# Patient Record
Sex: FEMALE | Race: WHITE | NOT HISPANIC OR LATINO
[De-identification: names, ages, dates, MRNs, and addresses within clinical notes are randomized per-mention and may not be internally consistent; named-entity substitution may affect disease eponyms.]

---

## 2022-07-15 PROBLEM — Z00.00 ENCOUNTER FOR PREVENTIVE HEALTH EXAMINATION: Status: ACTIVE | Noted: 2022-07-15

## 2022-08-15 ENCOUNTER — APPOINTMENT (OUTPATIENT)
Dept: ANTEPARTUM | Facility: CLINIC | Age: 32
End: 2022-08-15

## 2022-08-15 ENCOUNTER — ASOB RESULT (OUTPATIENT)
Age: 32
End: 2022-08-15

## 2022-08-15 PROCEDURE — 76811 OB US DETAILED SNGL FETUS: CPT

## 2022-12-22 ENCOUNTER — INPATIENT (INPATIENT)
Facility: HOSPITAL | Age: 32
LOS: 2 days | Discharge: HOME | End: 2022-12-25
Attending: OBSTETRICS & GYNECOLOGY | Admitting: OBSTETRICS & GYNECOLOGY
Payer: COMMERCIAL

## 2022-12-22 VITALS — HEART RATE: 80 BPM | OXYGEN SATURATION: 97 %

## 2022-12-22 DIAGNOSIS — O26.899 OTHER SPECIFIED PREGNANCY RELATED CONDITIONS, UNSPECIFIED TRIMESTER: ICD-10-CM

## 2022-12-22 DIAGNOSIS — Z34.80 ENCOUNTER FOR SUPERVISION OF OTHER NORMAL PREGNANCY, UNSPECIFIED TRIMESTER: ICD-10-CM

## 2022-12-22 LAB
BASOPHILS # BLD AUTO: 0.03 K/UL — SIGNIFICANT CHANGE UP (ref 0–0.2)
BASOPHILS NFR BLD AUTO: 0.2 % — SIGNIFICANT CHANGE UP (ref 0–2)
BLD GP AB SCN SERPL QL: NEGATIVE — SIGNIFICANT CHANGE UP
COVID-19 SPIKE DOMAIN AB INTERP: POSITIVE
COVID-19 SPIKE DOMAIN ANTIBODY RESULT: >250 U/ML — HIGH
EOSINOPHIL # BLD AUTO: 0.17 K/UL — SIGNIFICANT CHANGE UP (ref 0–0.5)
EOSINOPHIL NFR BLD AUTO: 1.1 % — SIGNIFICANT CHANGE UP (ref 0–6)
HCT VFR BLD CALC: 36 % — SIGNIFICANT CHANGE UP (ref 34.5–45)
HGB BLD-MCNC: 11 G/DL — LOW (ref 11.5–15.5)
IMM GRANULOCYTES NFR BLD AUTO: 1.5 % — HIGH (ref 0–0.9)
LYMPHOCYTES # BLD AUTO: 1.52 K/UL — SIGNIFICANT CHANGE UP (ref 1–3.3)
LYMPHOCYTES # BLD AUTO: 9.8 % — LOW (ref 13–44)
MCHC RBC-ENTMCNC: 23.2 PG — LOW (ref 27–34)
MCHC RBC-ENTMCNC: 30.6 GM/DL — LOW (ref 32–36)
MCV RBC AUTO: 75.8 FL — LOW (ref 80–100)
MONOCYTES # BLD AUTO: 1.42 K/UL — HIGH (ref 0–0.9)
MONOCYTES NFR BLD AUTO: 9.1 % — SIGNIFICANT CHANGE UP (ref 2–14)
NEUTROPHILS # BLD AUTO: 12.14 K/UL — HIGH (ref 1.8–7.4)
NEUTROPHILS NFR BLD AUTO: 78.3 % — HIGH (ref 43–77)
NRBC # BLD: 0 /100 WBCS — SIGNIFICANT CHANGE UP (ref 0–0)
PLATELET # BLD AUTO: 183 K/UL — SIGNIFICANT CHANGE UP (ref 150–400)
RBC # BLD: 4.75 M/UL — SIGNIFICANT CHANGE UP (ref 3.8–5.2)
RBC # FLD: 17.4 % — HIGH (ref 10.3–14.5)
RH IG SCN BLD-IMP: POSITIVE — SIGNIFICANT CHANGE UP
RH IG SCN BLD-IMP: POSITIVE — SIGNIFICANT CHANGE UP
SARS-COV-2 IGG+IGM SERPL QL IA: >250 U/ML — HIGH
SARS-COV-2 IGG+IGM SERPL QL IA: POSITIVE
SARS-COV-2 RNA SPEC QL NAA+PROBE: SIGNIFICANT CHANGE UP
T PALLIDUM AB TITR SER: NEGATIVE — SIGNIFICANT CHANGE UP
WBC # BLD: 15.52 K/UL — HIGH (ref 3.8–10.5)
WBC # FLD AUTO: 15.52 K/UL — HIGH (ref 3.8–10.5)

## 2022-12-22 RX ORDER — SODIUM CHLORIDE 9 MG/ML
300 INJECTION INTRAMUSCULAR; INTRAVENOUS; SUBCUTANEOUS ONCE
Refills: 0 | Status: DISCONTINUED | OUTPATIENT
Start: 2022-12-22 | End: 2022-12-25

## 2022-12-22 RX ORDER — INFLUENZA VIRUS VACCINE 15; 15; 15; 15 UG/.5ML; UG/.5ML; UG/.5ML; UG/.5ML
0.5 SUSPENSION INTRAMUSCULAR ONCE
Refills: 0 | Status: DISCONTINUED | OUTPATIENT
Start: 2022-12-22 | End: 2022-12-25

## 2022-12-22 RX ORDER — SODIUM CHLORIDE 9 MG/ML
1000 INJECTION, SOLUTION INTRAVENOUS
Refills: 0 | Status: DISCONTINUED | OUTPATIENT
Start: 2022-12-22 | End: 2022-12-23

## 2022-12-22 RX ORDER — OXYTOCIN 10 UNIT/ML
4 VIAL (ML) INJECTION
Qty: 30 | Refills: 0 | Status: DISCONTINUED | OUTPATIENT
Start: 2022-12-22 | End: 2022-12-23

## 2022-12-22 RX ORDER — VANCOMYCIN HCL 1 G
1500 VIAL (EA) INTRAVENOUS EVERY 8 HOURS
Refills: 0 | Status: DISCONTINUED | OUTPATIENT
Start: 2022-12-22 | End: 2022-12-23

## 2022-12-22 RX ORDER — SODIUM CHLORIDE 9 MG/ML
1000 INJECTION INTRAMUSCULAR; INTRAVENOUS; SUBCUTANEOUS
Refills: 0 | Status: DISCONTINUED | OUTPATIENT
Start: 2022-12-22 | End: 2022-12-25

## 2022-12-22 RX ORDER — OXYTOCIN 10 UNIT/ML
333.33 VIAL (ML) INJECTION
Qty: 20 | Refills: 0 | Status: COMPLETED | OUTPATIENT
Start: 2022-12-22 | End: 2022-12-22

## 2022-12-22 RX ORDER — CITRIC ACID/SODIUM CITRATE 300-500 MG
15 SOLUTION, ORAL ORAL EVERY 6 HOURS
Refills: 0 | Status: DISCONTINUED | OUTPATIENT
Start: 2022-12-22 | End: 2022-12-23

## 2022-12-22 RX ORDER — CHLORHEXIDINE GLUCONATE 213 G/1000ML
1 SOLUTION TOPICAL ONCE
Refills: 0 | Status: DISCONTINUED | OUTPATIENT
Start: 2022-12-22 | End: 2022-12-23

## 2022-12-22 RX ADMIN — SODIUM CHLORIDE 125 MILLILITER(S): 9 INJECTION, SOLUTION INTRAVENOUS at 12:42

## 2022-12-22 RX ADMIN — Medication 0.25 MILLIGRAM(S): at 22:42

## 2022-12-22 RX ADMIN — Medication 4 MILLIUNIT(S)/MIN: at 14:36

## 2022-12-22 RX ADMIN — SODIUM CHLORIDE 125 MILLILITER(S): 9 INJECTION, SOLUTION INTRAVENOUS at 14:36

## 2022-12-22 RX ADMIN — Medication 300 MILLIGRAM(S): at 16:40

## 2022-12-22 NOTE — PRE-ANESTHESIA EVALUATION ADULT - NSANTHPMHFT_GEN_ALL_CORE
Precipitous labor  Genital herpes  Anxiety  Scoliosis    Denies Precipitous labor  Genital herpes  Anxiety  Scoliosis  Sciatica throughout pregnancy

## 2022-12-22 NOTE — PRE-ANESTHESIA EVALUATION ADULT - NSANTHBPHIGHRD_ENT_A_CORE
Hi there. This pt would like to see you. Are you offering virtual visits at this time? Can you reach out to her? Thanks. Hope you're doing well. No

## 2022-12-22 NOTE — OB RN PATIENT PROFILE - FUNCTIONAL ASSESSMENT - BASIC MOBILITY 6.
4-calculated by average/Not able to assess (calculate score using [facility] averaging method)

## 2022-12-22 NOTE — OB PROVIDER LABOR PROGRESS NOTE - NS_STATION_OBGYN_ALL_OB_NU
-3
Pt aox4. Pt c/o marianna ear pressure, sinus congestion, fever and chills started yesterday. Pt denies cough.
-3
-3

## 2022-12-22 NOTE — OB PROVIDER H&P - NSHPPHYSICALEXAM_GEN_ALL_CORE
Physical Exam     Gen: NAD, A&O x 3  Pulm: nonlabor breathing   Heart: RRR  Abd: soft, gravid  VE no visible lesions on vulvar, cervix 1/50/-3, grossly ruptured, + nitrazine     BSUS: Vertex

## 2022-12-22 NOTE — OB RN PATIENT PROFILE - FALL HARM RISK - UNIVERSAL INTERVENTIONS
Bed in lowest position, wheels locked, appropriate side rails in place/Call bell, personal items and telephone in reach/Instruct patient to call for assistance before getting out of bed or chair/Non-slip footwear when patient is out of bed/[place] to call system/Physically safe environment - no spills, clutter or unnecessary equipment/Purposeful Proactive Rounding/Room/bathroom lighting operational, light cord in reach

## 2022-12-22 NOTE — OB PROVIDER H&P - HISTORY OF PRESENT ILLNESS
33 y/o  @ 38w6d ARNOLDO 22, presents with c/o gush of fluid @ 10:30a, then started to have contractions every 3-4 minutes at 11a. reports +FM, denies vaginal bleeding     - POBHx:  (10/29/15, 8lb 6oz, c/b PPH),  (18, 8lb 12oz, precipitous delivery)  - PGYNHx: denies hx of fibroid or ovarian cysts. Hx of HSV (only outbreak ~ 1 year ago, on Valtrex 500 mg BID for suppression)  - PMHx: denies   - PSHx: denies   - ALL: PCN- unknown reactions  - SH: Hx of anxiety on Fluoxetine 20 mg QD, denies hx of depression

## 2022-12-22 NOTE — OB PROVIDER LABOR PROGRESS NOTE - ASSESSMENT
A/P 32y  @38w6d IOL  -IOL: c/w pit  -Cat 2 tracing  -GBS pos, on vanc  -EFW 4600  -COVID neg  -Analgesia epi  -Anticipate     d/w Dr. Mark Ortiz, PGY-1  
- For anesthesia top off  - Continue to monitor    Kishore Ortiz, PGY-1  
A/P 32y  @38w6d IOL  -IOL: pitocin paused for recovery of FHT, IUPC placed, ordered for amnioinfusion  -Cat 2 tracing  -GBS pos, on vanc  -EFW 4600  -COVID neg  -Analgesia epi  -Anticipate     d/w Dr. Mark Ortiz, PGY-1
Pt. to receive epidural.  Anticipate vaginal delivery.    SUPRIYA Govea

## 2022-12-22 NOTE — OB PROVIDER H&P - ASSESSMENT
31 y/o  @ 38w6d ARNOLDO 22, PROM @ 10:30a    - Admit to L&D  - CLD/IVF  - EFW/[place]  - admission labs, COVID PCR  - +GBS: may be resistant to clindamycin on report, will start vancomycin   - expectant management for now    d/w Dr. Charles HOLCOMBC

## 2022-12-22 NOTE — OB PROVIDER LABOR PROGRESS NOTE - NS_SUBJECTIVE/OBJECTIVE_OBGYN_ALL_OB_FT
Terb x1 given in the setting of tetanic contractions with recurrent decels.
R2 Labor Note    S: Patient evaluated at bedside for placement of IUPC.    O:  T(C): 37.1 (12-22-22 @ 19:28), Max: 37.1 (12-22-22 @ 19:28)  HR: 95 (12-22-22 @ 22:10) (74 - 104)  BP: 138/96 (12-22-22 @ 22:06) (94/50 - 142/85)  RR: 17 (12-22-22 @ 17:06) (17 - 17)  SpO2: 99% (12-22-22 @ 22:15) (88% - 100%)
Pt. on pitocin c/o painful ctx requesting epidural.
R1 Labor Note    S: Patient evaluated at bedside for cervical change. Notes more pelvic pressure.    O:  T(C): 37.1 (12-22-22 @ 19:28), Max: 37.1 (12-22-22 @ 19:28)  HR: 95 (12-22-22 @ 22:10) (74 - 104)  BP: 138/96 (12-22-22 @ 22:06) (94/50 - 142/85)  RR: 17 (12-22-22 @ 17:06) (17 - 17)  SpO2: 98% (12-22-22 @ 22:10) (88% - 100%)

## 2022-12-22 NOTE — PROGRESS NOTE ADULT - SUBJECTIVE AND OBJECTIVE BOX
Anesthesiology Attending Event Note:    Very anxious patient with sciatica during this entire pregnancy and scoliosis. States that during her previous pregnancy she did not have sciatica but did have "back labor". Asked for epidural early during this labor because she had a precipitous labor in the past. While placing epidural,  when the catheter was placed, the patient complained of paresthesias that did not resolve. The catheter was removed. Again placed the epidural at a different level without any complications.     With test dose, the patient stated that she had ringing in her ears but that she always gets ringing in her ears. She denied any paresthesias, palpitations, sudden onset of foot or leg numbness, or a metallic taste in her mouth. The ringing in her ears resolved after a few minutes. Her vital signs were stable throughout. She denied any tinnitus with repeat of the test dose. PCEA pump connected and run continuously. Patient stable throughout. Anesthesiology Attending Event Note:    Very anxious patient with sciatica during this entire pregnancy and scoliosis. States that during her previous pregnancy she did not have sciatica but did have "back labor". Asked for epidural early during this labor because she had a precipitous labor in the past. While placing epidural,  when the catheter was placed, the patient complained of paresthesias that did not resolve. The catheter was removed. Again placed the epidural at a different level without any complications.     With test dose, the patient stated that she "had ringing in her ears but that she always gets ringing in her ears". She denied any paresthesias, palpitations, sudden onset of foot or leg numbness, or a metallic taste in her mouth. The ringing in her ears resolved after a few minutes. Her vital signs were stable throughout. She denied any tinnitus with repeat of the test dose. PCEA pump connected and run continuously. Patient stable throughout.

## 2022-12-22 NOTE — PRE-ANESTHESIA EVALUATION ADULT - NSANTHOSAYNRD_GEN_A_CORE
No. WERNER screening performed.  STOP BANG Legend: 0-2 = LOW Risk; 3-4 = INTERMEDIATE Risk; 5-8 = HIGH Risk

## 2022-12-23 RX ORDER — HYDROCORTISONE 1 %
1 OINTMENT (GRAM) TOPICAL EVERY 6 HOURS
Refills: 0 | Status: DISCONTINUED | OUTPATIENT
Start: 2022-12-23 | End: 2022-12-25

## 2022-12-23 RX ORDER — IBUPROFEN 200 MG
600 TABLET ORAL EVERY 6 HOURS
Refills: 0 | Status: COMPLETED | OUTPATIENT
Start: 2022-12-23 | End: 2023-11-21

## 2022-12-23 RX ORDER — IBUPROFEN 200 MG
600 TABLET ORAL EVERY 6 HOURS
Refills: 0 | Status: DISCONTINUED | OUTPATIENT
Start: 2022-12-23 | End: 2022-12-25

## 2022-12-23 RX ORDER — SODIUM CHLORIDE 9 MG/ML
3 INJECTION INTRAMUSCULAR; INTRAVENOUS; SUBCUTANEOUS EVERY 8 HOURS
Refills: 0 | Status: DISCONTINUED | OUTPATIENT
Start: 2022-12-23 | End: 2022-12-25

## 2022-12-23 RX ORDER — FLUOXETINE HCL 10 MG
20 CAPSULE ORAL DAILY
Refills: 0 | Status: DISCONTINUED | OUTPATIENT
Start: 2022-12-23 | End: 2022-12-25

## 2022-12-23 RX ORDER — OXYCODONE HYDROCHLORIDE 5 MG/1
5 TABLET ORAL
Refills: 0 | Status: DISCONTINUED | OUTPATIENT
Start: 2022-12-23 | End: 2022-12-25

## 2022-12-23 RX ORDER — PRAMOXINE HYDROCHLORIDE 150 MG/15G
1 AEROSOL, FOAM RECTAL EVERY 4 HOURS
Refills: 0 | Status: DISCONTINUED | OUTPATIENT
Start: 2022-12-23 | End: 2022-12-25

## 2022-12-23 RX ORDER — KETOROLAC TROMETHAMINE 30 MG/ML
30 SYRINGE (ML) INJECTION ONCE
Refills: 0 | Status: DISCONTINUED | OUTPATIENT
Start: 2022-12-23 | End: 2022-12-23

## 2022-12-23 RX ORDER — BENZOCAINE 10 %
1 GEL (GRAM) MUCOUS MEMBRANE EVERY 6 HOURS
Refills: 0 | Status: DISCONTINUED | OUTPATIENT
Start: 2022-12-23 | End: 2022-12-25

## 2022-12-23 RX ORDER — AER TRAVELER 0.5 G/1
1 SOLUTION RECTAL; TOPICAL EVERY 4 HOURS
Refills: 0 | Status: DISCONTINUED | OUTPATIENT
Start: 2022-12-23 | End: 2022-12-25

## 2022-12-23 RX ORDER — TETANUS TOXOID, REDUCED DIPHTHERIA TOXOID AND ACELLULAR PERTUSSIS VACCINE, ADSORBED 5; 2.5; 8; 8; 2.5 [IU]/.5ML; [IU]/.5ML; UG/.5ML; UG/.5ML; UG/.5ML
0.5 SUSPENSION INTRAMUSCULAR ONCE
Refills: 0 | Status: DISCONTINUED | OUTPATIENT
Start: 2022-12-23 | End: 2022-12-25

## 2022-12-23 RX ORDER — OXYTOCIN 10 UNIT/ML
41.67 VIAL (ML) INJECTION
Qty: 20 | Refills: 0 | Status: DISCONTINUED | OUTPATIENT
Start: 2022-12-23 | End: 2022-12-25

## 2022-12-23 RX ORDER — OXYCODONE HYDROCHLORIDE 5 MG/1
5 TABLET ORAL ONCE
Refills: 0 | Status: DISCONTINUED | OUTPATIENT
Start: 2022-12-23 | End: 2022-12-25

## 2022-12-23 RX ORDER — LANOLIN
1 OINTMENT (GRAM) TOPICAL EVERY 6 HOURS
Refills: 0 | Status: DISCONTINUED | OUTPATIENT
Start: 2022-12-23 | End: 2022-12-25

## 2022-12-23 RX ORDER — DIBUCAINE 1 %
1 OINTMENT (GRAM) RECTAL EVERY 6 HOURS
Refills: 0 | Status: DISCONTINUED | OUTPATIENT
Start: 2022-12-23 | End: 2022-12-25

## 2022-12-23 RX ORDER — ACETAMINOPHEN 500 MG
975 TABLET ORAL
Refills: 0 | Status: DISCONTINUED | OUTPATIENT
Start: 2022-12-23 | End: 2022-12-25

## 2022-12-23 RX ORDER — SIMETHICONE 80 MG/1
80 TABLET, CHEWABLE ORAL EVERY 4 HOURS
Refills: 0 | Status: DISCONTINUED | OUTPATIENT
Start: 2022-12-23 | End: 2022-12-25

## 2022-12-23 RX ORDER — DIPHENHYDRAMINE HCL 50 MG
25 CAPSULE ORAL EVERY 6 HOURS
Refills: 0 | Status: DISCONTINUED | OUTPATIENT
Start: 2022-12-23 | End: 2022-12-25

## 2022-12-23 RX ORDER — MAGNESIUM HYDROXIDE 400 MG/1
30 TABLET, CHEWABLE ORAL
Refills: 0 | Status: DISCONTINUED | OUTPATIENT
Start: 2022-12-23 | End: 2022-12-25

## 2022-12-23 RX ADMIN — Medication 20 MILLIGRAM(S): at 11:43

## 2022-12-23 RX ADMIN — OXYCODONE HYDROCHLORIDE 5 MILLIGRAM(S): 5 TABLET ORAL at 11:42

## 2022-12-23 RX ADMIN — Medication 975 MILLIGRAM(S): at 16:46

## 2022-12-23 RX ADMIN — Medication 975 MILLIGRAM(S): at 21:22

## 2022-12-23 RX ADMIN — Medication 600 MILLIGRAM(S): at 19:45

## 2022-12-23 RX ADMIN — SODIUM CHLORIDE 3 MILLILITER(S): 9 INJECTION INTRAMUSCULAR; INTRAVENOUS; SUBCUTANEOUS at 06:56

## 2022-12-23 RX ADMIN — Medication 600 MILLIGRAM(S): at 12:41

## 2022-12-23 RX ADMIN — Medication 975 MILLIGRAM(S): at 10:05

## 2022-12-23 RX ADMIN — Medication 600 MILLIGRAM(S): at 06:56

## 2022-12-23 RX ADMIN — OXYCODONE HYDROCHLORIDE 5 MILLIGRAM(S): 5 TABLET ORAL at 05:38

## 2022-12-23 RX ADMIN — Medication 975 MILLIGRAM(S): at 02:24

## 2022-12-23 RX ADMIN — Medication 600 MILLIGRAM(S): at 18:33

## 2022-12-23 RX ADMIN — Medication 600 MILLIGRAM(S): at 11:41

## 2022-12-23 RX ADMIN — Medication 30 MILLIGRAM(S): at 02:32

## 2022-12-23 RX ADMIN — OXYCODONE HYDROCHLORIDE 5 MILLIGRAM(S): 5 TABLET ORAL at 06:13

## 2022-12-23 RX ADMIN — Medication 600 MILLIGRAM(S): at 06:13

## 2022-12-23 RX ADMIN — Medication 30 MILLIGRAM(S): at 01:04

## 2022-12-23 RX ADMIN — Medication 975 MILLIGRAM(S): at 20:52

## 2022-12-23 RX ADMIN — Medication 975 MILLIGRAM(S): at 09:05

## 2022-12-23 RX ADMIN — Medication 1000 MILLIUNIT(S)/MIN: at 00:25

## 2022-12-23 RX ADMIN — OXYCODONE HYDROCHLORIDE 5 MILLIGRAM(S): 5 TABLET ORAL at 12:41

## 2022-12-23 RX ADMIN — SODIUM CHLORIDE 3 MILLILITER(S): 9 INJECTION INTRAMUSCULAR; INTRAVENOUS; SUBCUTANEOUS at 21:08

## 2022-12-23 RX ADMIN — Medication 1 TABLET(S): at 11:41

## 2022-12-23 RX ADMIN — Medication 975 MILLIGRAM(S): at 15:46

## 2022-12-23 NOTE — PROGRESS NOTE ADULT - SUBJECTIVE AND OBJECTIVE BOX
late entry note    patient was seen at bedside for labor progress    sve 10/100/0  pt with urge to push   fht with george decels now sp terb, tracing recovered with terb and maternal repositioning   mod george, +a, +ctx  pt started to push at 11pm with epi in place

## 2022-12-23 NOTE — OB RN DELIVERY SUMMARY - AS DELIV COMPLICATIONS OB
abnormal fetal heart rate tracing/nuchal cord/prolonged rupture of membranes/premature rupture of membranes prior to labor

## 2022-12-23 NOTE — OB NEONATOLOGY/PEDIATRICIAN DELIVERY SUMMARY - NSPEDSNEONOTESA_OBGYN_ALL_OB_FT
Peds NP called to attend delivery for use of vacuum. 39 wk female born via VAVD on  @ 0022 to a 34 y/o  blood type O+ mother. Maternal history of HSV on Valtrex (last outbreak 1 year ago), anxiety on Fluoxetine and  x 2. No significant prenatal history. PNL as follows: HIV -, Hep B - RPR NR, Rubella I, GBS +, treated with Vancomycin x 1 @ 1640 on . SROM at 1030 on  with clear fluid. Baby emerged stunned, limp, apneic and pale with poor tone. Brought to , was warmed, dried, suctioned and stimulated with heart rate <100. PPV initiated, CODE 100 called. PPV continued for 5-7 minutes for apnea and poor color. Transitioned to CPAP @ 7+minutes of life once HR >100 and color improved. APGARS of 2/6 - infant transferred to NICU for continued management. Plans for feeding unknown at time of delivery. EOS ___.  Highest maternal temp ___ Peds NP called to attend delivery for use of vacuum. 39 wk female born via VAVD on  @ 0022 to a 34 y/o  blood type O+ mother. Maternal history of HSV on Valtrex (last outbreak 1 year ago), anxiety on Fluoxetine and  x 2. No significant prenatal history. PNL as follows: HIV -, Hep B - RPR NR, Rubella I, GBS +, treated with Vancomycin x 1 @ 1640 on . SROM at 1030 on  with clear fluid. Baby emerged stunned, limp, apneic and pale with poor tone. Brought to , was warmed, dried, suctioned and stimulated with heart rate <100. PPV initiated, CODE 100 called. PPV continued for 5-7 minutes for apnea and poor color. Transitioned to CPAP @ 7+minutes of life once HR >100 and color improved. APGARS of 2/6 - infant transferred to NICU for continued management. Plans for feeding unknown at time of delivery. EOS 0.72.  Highest maternal temp 37.5. Peds NP called to attend delivery for use of vacuum. 39 wk female born via VAVD on  @ 0022 to a 32 y/o  blood type O+ mother. Maternal history of HSV on Valtrex (last outbreak 1 year ago), anxiety on Fluoxetine and  x 2. No significant prenatal history. PNL as follows: HIV -, Hep B - RPR NR, Rubella I, GBS +, treated with Vancomycin x 1 @ 1640 on . SROM at 1030 on  with clear fluid. Baby emerged with nuchal cord x 2. Baby emerged stunned, limp, apneic and pale with poor tone. Brought to , was warmed, dried, suctioned and stimulated with heart rate <100. PPV 20/5, 100% initiated, CODE 100 called. PPV continued for ~4 minutes; HR improved to >100. Transitioned to CPAP 5, 100% @ 5 minutes of life once respiratory effort improved. FiO2 weaned to 21%, and baby transferred to NICU on CPAP 5/21%. APGARS of 2/6 - infant transferred to NICU for continued management. Plans for feeding unknown at time of delivery. EOS 0.95.  Highest maternal temp 37.5. Parents updated prior to transfer to NICU.

## 2022-12-23 NOTE — OB PROVIDER DELIVERY SUMMARY - NSEBL_OBGYN_ALL_OB_NU
Pt wanted time to consider options for route of delivery in light of dx of chorioamnionitis, long labor, SROM for nearly 24 hrs, +1 station after pushing for 90 minutes and estimated fetal weight of 9lbs  She requests repeat C/S at this time now  Pitocin has been off for nearly 2 hours  Anesthesia and nursing staff aware 
300

## 2022-12-23 NOTE — OB RN DELIVERY SUMMARY - NS_SEPSISRSKCALC_OBGYN_ALL_OB_FT
EOS calculated successfully. EOS Risk Factor: 0.5/1000 live births ([facility] [place] national incidence); GA=39w;Temp=99.5; ROM=24.367; GBS='Positive'; Antibiotics='Broad spectrum antibiotics > 4 hrs prior to birth'   EOS calculated successfully. EOS Risk Factor: 0.5/1000 live births ([facility] [place] national incidence); GA=39w;Temp=99.7; ROM=24.367; GBS='Positive'; Antibiotics='Broad spectrum antibiotics > 4 hrs prior to birth'

## 2022-12-23 NOTE — OB PROVIDER DELIVERY SUMMARY - NSSELHIDDEN_OBGYN_ALL_OB_FT
[NS_DeliveryAssist1_OBGYN_ALL_OB_FT:[id number]],[NS_DeliveryRN_OBGYN_ALL_OB_FT:[id number]],[NS_DeliveryAttending1_OBGYN_ALL_OB_FT:[id number]]

## 2022-12-23 NOTE — OB RN DELIVERY SUMMARY - NSSELHIDDEN_OBGYN_ALL_OB_FT
[NS_DeliveryAssist1_OBGYN_ALL_OB_FT:[id number]],[NS_DeliveryRN_OBGYN_ALL_OB_FT:[id number]]

## 2022-12-24 ENCOUNTER — TRANSCRIPTION ENCOUNTER (OUTPATIENT)
Age: 32
End: 2022-12-24

## 2022-12-24 RX ADMIN — Medication 600 MILLIGRAM(S): at 13:01

## 2022-12-24 RX ADMIN — Medication 600 MILLIGRAM(S): at 18:20

## 2022-12-24 RX ADMIN — Medication 1 TABLET(S): at 12:00

## 2022-12-24 RX ADMIN — Medication 975 MILLIGRAM(S): at 20:58

## 2022-12-24 RX ADMIN — Medication 975 MILLIGRAM(S): at 21:30

## 2022-12-24 RX ADMIN — OXYCODONE HYDROCHLORIDE 5 MILLIGRAM(S): 5 TABLET ORAL at 12:05

## 2022-12-24 RX ADMIN — Medication 600 MILLIGRAM(S): at 12:01

## 2022-12-24 RX ADMIN — Medication 975 MILLIGRAM(S): at 16:48

## 2022-12-24 RX ADMIN — Medication 975 MILLIGRAM(S): at 10:01

## 2022-12-24 RX ADMIN — Medication 600 MILLIGRAM(S): at 01:14

## 2022-12-24 RX ADMIN — OXYCODONE HYDROCHLORIDE 5 MILLIGRAM(S): 5 TABLET ORAL at 13:01

## 2022-12-24 RX ADMIN — Medication 600 MILLIGRAM(S): at 17:39

## 2022-12-24 RX ADMIN — Medication 975 MILLIGRAM(S): at 15:58

## 2022-12-24 RX ADMIN — Medication 20 MILLIGRAM(S): at 12:01

## 2022-12-24 RX ADMIN — Medication 600 MILLIGRAM(S): at 05:40

## 2022-12-24 RX ADMIN — Medication 600 MILLIGRAM(S): at 05:10

## 2022-12-24 RX ADMIN — Medication 600 MILLIGRAM(S): at 00:44

## 2022-12-24 RX ADMIN — SIMETHICONE 80 MILLIGRAM(S): 80 TABLET, CHEWABLE ORAL at 00:44

## 2022-12-24 RX ADMIN — Medication 975 MILLIGRAM(S): at 02:48

## 2022-12-24 RX ADMIN — OXYCODONE HYDROCHLORIDE 5 MILLIGRAM(S): 5 TABLET ORAL at 17:42

## 2022-12-24 RX ADMIN — Medication 975 MILLIGRAM(S): at 03:18

## 2022-12-24 RX ADMIN — Medication 975 MILLIGRAM(S): at 09:12

## 2022-12-24 NOTE — DISCHARGE NOTE OB - NS MD DC FALL RISK RISK
For information on Fall & Injury Prevention, visit: [email or web address] OR  [email or web address] OR  https://www.cdc.gov/steadi/patient.html

## 2022-12-24 NOTE — DISCHARGE NOTE OB - MEDICATION SUMMARY - MEDICATIONS TO TAKE
I will START or STAY ON the medications listed below when I get home from the hospital:    Prenatal Multivitamins with Folic Acid 1 mg oral tablet  -- 1 tab(s) by mouth once a day  -- Indication: For Supervision of other normal pregnancy, antepartum

## 2022-12-24 NOTE — DISCHARGE NOTE OB - MATERIALS PROVIDED
[facility] [place] Screening Program/[place]  Immunization Record/Breastfeeding Log/Guide to Postpartum Care/[facility] Hearing Screen Program/Shaken Baby Prevention Handout/Breastfeeding Guide and Packet/Birth Certificate Instructions/Discharge Medication Information for Patients and Families Pocket Guide

## 2022-12-24 NOTE — DISCHARGE NOTE OB - PATIENT PORTAL LINK FT
You can access the FollowMyHealth Patient Portal offered by [facility] by registering at the following website: [email or web address]. By joining [facility]’s FollowMyHealth portal, you will also be able to view your health information using other applications (apps) compatible with our system.

## 2022-12-24 NOTE — PROGRESS NOTE ADULT - SUBJECTIVE AND OBJECTIVE BOX
Postpartum Note- PPD#1    Allergies    penicillin (Unknown)    Intolerances          Rubella IgG:   Immune               RPR:   Negative                    Blood Type:   O+    S:Patient is a  32y   G 3   P 3     PPD#1         S/P     VAVD  Patient w/o complaints, pain is controlled.    Pt is OOB, tolerating PO, passing flatus. Lochia WNL.   Feeding: Breastfeeding    O:  Vital Signs Last 24 Hrs  T(C): 36.8 (24 Dec 2022 05:22), Max: 36.8 (23 Dec 2022 18:45)  T(F): 98.2 (24 Dec 2022 05:22), Max: 98.2 (23 Dec 2022 18:45)  HR: 79 (24 Dec 2022 05:22) (70 - 83)  BP: 107/66 (24 Dec 2022 05:22) (95/60 - 116/70)  BP(mean): --  RR: 17 (24 Dec 2022 05:22) (17 - 18)  SpO2: 98% (24 Dec 2022 05:22) (98% - 98%)    Parameters below as of 24 Dec 2022 05:22  Patient On (Oxygen Delivery Method): room air         Gen: NAD  CV: rrr s1s2, CTABL  Abdomen: Soft, nontender, non-distended, fundus firm.  Lochia: WNL  Perineum: second degree laceration  Ext: Neg edema, Neg calf tenderness.  Pedal pulses palpated B/L    LABS:    Hemoglobin: 11.0 g/dL (12-22 @ 15:14)      Hematocrit: 36.0 % (12-22 @ 15:14)      A/P:  32y  PPD # 1      S/P   VAVD,     doing well    PMHx:  (10/29/15, 8lb 6oz, c/b PPH),  (18, 8lb 12oz, precipitous delivery)  Current Issues: none    Increase OOB  Regular diet  PO Pain protocol  AM H&H  Routine Postpartum Care

## 2022-12-24 NOTE — DISCHARGE NOTE OB - CARE PROVIDER_API CALL
Gerard Soto  OBSTETRICS AND GYNECOLOGY  [address], [address]  [place] [address]  Phone: [phone]  Fax: [phone]  Follow Up Time:

## 2022-12-25 VITALS
OXYGEN SATURATION: 98 % | DIASTOLIC BLOOD PRESSURE: 73 MMHG | SYSTOLIC BLOOD PRESSURE: 117 MMHG | RESPIRATION RATE: 18 BRPM | TEMPERATURE: 99 F | HEART RATE: 74 BPM

## 2022-12-25 PROCEDURE — 86769 SARS-COV-2 COVID-19 ANTIBODY: CPT

## 2022-12-25 PROCEDURE — 59050 FETAL MONITOR W/REPORT: CPT

## 2022-12-25 PROCEDURE — U0003: CPT

## 2022-12-25 PROCEDURE — 86780 TREPONEMA PALLIDUM: CPT

## 2022-12-25 PROCEDURE — 86850 RBC ANTIBODY SCREEN: CPT

## 2022-12-25 PROCEDURE — 86901 BLOOD TYPING SEROLOGIC RH(D): CPT

## 2022-12-25 PROCEDURE — 86900 BLOOD TYPING SEROLOGIC ABO: CPT

## 2022-12-25 PROCEDURE — 87635 SARS-COV-2 COVID-19 AMP PRB: CPT

## 2022-12-25 PROCEDURE — 85025 COMPLETE CBC W/AUTO DIFF WBC: CPT

## 2022-12-25 PROCEDURE — 59025 FETAL NON-STRESS TEST: CPT

## 2022-12-25 RX ADMIN — Medication 600 MILLIGRAM(S): at 06:16

## 2022-12-25 RX ADMIN — OXYCODONE HYDROCHLORIDE 5 MILLIGRAM(S): 5 TABLET ORAL at 05:38

## 2022-12-25 RX ADMIN — Medication 600 MILLIGRAM(S): at 00:35

## 2022-12-25 RX ADMIN — OXYCODONE HYDROCHLORIDE 5 MILLIGRAM(S): 5 TABLET ORAL at 00:06

## 2022-12-25 RX ADMIN — Medication 1 TABLET(S): at 13:00

## 2022-12-25 RX ADMIN — Medication 600 MILLIGRAM(S): at 11:47

## 2022-12-25 RX ADMIN — Medication 600 MILLIGRAM(S): at 00:02

## 2022-12-25 RX ADMIN — Medication 975 MILLIGRAM(S): at 10:18

## 2022-12-25 RX ADMIN — Medication 975 MILLIGRAM(S): at 09:38

## 2022-12-25 RX ADMIN — Medication 600 MILLIGRAM(S): at 05:36

## 2022-12-25 RX ADMIN — OXYCODONE HYDROCHLORIDE 5 MILLIGRAM(S): 5 TABLET ORAL at 11:48

## 2023-05-14 NOTE — OB RN PATIENT PROFILE - FALL HARM RISK - FALL HARM RISK
PAST MEDICAL HISTORY:  DDD (degenerative disc disease), lumbar     Obesity     PCOS (polycystic ovarian syndrome)     Pre-diabetes     Renal calculi left- non obstructing    
No indicators present

## 2023-08-25 NOTE — OB RN PATIENT PROFILE - NS_CONTRACTPATTER_OBGYN_ALL_OB
Regular RICE therapy advised no acute fracture acetaminophen ibuprofen advised for pain patient agrees to plan of care